# Patient Record
Sex: MALE | Race: WHITE | NOT HISPANIC OR LATINO
[De-identification: names, ages, dates, MRNs, and addresses within clinical notes are randomized per-mention and may not be internally consistent; named-entity substitution may affect disease eponyms.]

---

## 2023-05-30 ENCOUNTER — APPOINTMENT (OUTPATIENT)
Dept: ORTHOPEDIC SURGERY | Facility: CLINIC | Age: 33
End: 2023-05-30
Payer: COMMERCIAL

## 2023-05-30 VITALS — WEIGHT: 185 LBS | HEIGHT: 70 IN | BODY MASS INDEX: 26.48 KG/M2

## 2023-05-30 DIAGNOSIS — S63.8X1A SPRAIN OF OTHER PART OF RIGHT WRIST AND HAND, INITIAL ENCOUNTER: ICD-10-CM

## 2023-05-30 DIAGNOSIS — Z78.9 OTHER SPECIFIED HEALTH STATUS: ICD-10-CM

## 2023-05-30 PROCEDURE — 99203 OFFICE O/P NEW LOW 30 MIN: CPT

## 2023-05-30 PROCEDURE — 73130 X-RAY EXAM OF HAND: CPT | Mod: RT

## 2023-05-30 NOTE — PHYSICAL EXAM
[5th] : 5th [CMC Joint] : CMC joint [Right] : right hand [There are no fractures, subluxations or dislocations. No significant abnormalities are seen] : There are no fractures, subluxations or dislocations. No significant abnormalities are seen [] : no ecchymosis [de-identified] : hook of hamate

## 2023-05-30 NOTE — HISTORY OF PRESENT ILLNESS
[7] : 7 [1] : 2 [Localized] : localized [Sharp] : sharp [Full time] : Work status: full time [de-identified] : 33 year old male presenting with a RIGHT hand injury. He struck the ground while playing golf. Pain is in the 5th metacarpal and CMC. \par DOI: 4/27/23  [] : Post Surgical Visit: no [FreeTextEntry1] : R hand/wrist  [FreeTextEntry3] : 4/27/23 [FreeTextEntry5] : 32 y/o RHD M eval R hand/wrist s/p golf injury when his club jammed the bunker wall and his hand buckled on DOI above. pt states he re injured the same had on 5/11/23 with same DORETHA. No prior TX  [de-identified] : None [de-identified] :

## 2023-05-30 NOTE — DISCUSSION/SUMMARY
[de-identified] : Discussed the nature of the diagnosis and risk and benefits of different modalities of treatment.\par Xrays reviewed. \par 5th CMC sprain.\par Recommended rest.\par RTO 2 weeks. 37.3 30.4

## 2023-06-12 ENCOUNTER — APPOINTMENT (OUTPATIENT)
Dept: ORTHOPEDIC SURGERY | Facility: CLINIC | Age: 33
End: 2023-06-12
Payer: COMMERCIAL

## 2023-06-12 VITALS — BODY MASS INDEX: 26.48 KG/M2 | WEIGHT: 185 LBS | HEIGHT: 70 IN

## 2023-06-12 DIAGNOSIS — S63.8X1A SPRAIN OF OTHER PART OF RIGHT WRIST AND HAND, INITIAL ENCOUNTER: ICD-10-CM

## 2023-06-12 PROCEDURE — 99213 OFFICE O/P EST LOW 20 MIN: CPT

## 2023-06-12 NOTE — HISTORY OF PRESENT ILLNESS
[3] : 3 [0] : 0 [Rest] : rest [Full time] : Work status: full time [de-identified] : 33 year old male followed for a RT 5th CMC sprain. Has been resting the hand. He is feeling improved. \par DOI: 4/27/23  [] : no [FreeTextEntry1] : right hand/wrist  [FreeTextEntry3] : 4/27/23 [de-identified] : golfing, gripping  [de-identified] : r [de-identified] :

## 2023-06-12 NOTE — DISCUSSION/SUMMARY
[de-identified] : He is improved at this time. \par Slowly start it increase activity after 2 weeks. \par RTO 3 weeks. \par

## 2023-06-12 NOTE — PHYSICAL EXAM
[Right] : right hand [5th] : 5th [CMC Joint] : CMC joint [] : negative Cordoba's [de-identified] : trace tenderness

## 2023-06-13 ENCOUNTER — APPOINTMENT (OUTPATIENT)
Dept: ORTHOPEDIC SURGERY | Facility: CLINIC | Age: 33
End: 2023-06-13

## 2023-06-26 ENCOUNTER — APPOINTMENT (OUTPATIENT)
Dept: ORTHOPEDIC SURGERY | Facility: CLINIC | Age: 33
End: 2023-06-26

## 2024-05-17 ENCOUNTER — APPOINTMENT (OUTPATIENT)
Dept: ORTHOPEDIC SURGERY | Facility: CLINIC | Age: 34
End: 2024-05-17
Payer: COMMERCIAL

## 2024-05-17 VITALS — HEIGHT: 70 IN | WEIGHT: 185 LBS | BODY MASS INDEX: 26.48 KG/M2

## 2024-05-17 DIAGNOSIS — M65.9 SYNOVITIS AND TENOSYNOVITIS, UNSPECIFIED: ICD-10-CM

## 2024-05-17 PROCEDURE — 99213 OFFICE O/P EST LOW 20 MIN: CPT

## 2024-05-17 PROCEDURE — 73110 X-RAY EXAM OF WRIST: CPT | Mod: LT

## 2024-05-17 NOTE — PHYSICAL EXAM
[Dorsal Wrist] : dorsal wrist [Left] : left fingers [There are no fractures, subluxations or dislocations. No significant abnormalities are seen] : There are no fractures, subluxations or dislocations. No significant abnormalities are seen [] : negative Cordoba's [FreeTextEntry3] : sight dorsal wrist fullness

## 2024-05-17 NOTE — HISTORY OF PRESENT ILLNESS
[de-identified] : 34 year old male presenting with LEFT dorsal wrist pain for the past 2 months. No specific injury/trauma. Pain with forced flexion and extension of the wrist. Symptoms improved after taking NSAIDs.    [FreeTextEntry1] : LFT wrist/hand [FreeTextEntry5] : LFT wrist/hand developed a while ago. States he plays golf

## 2024-05-17 NOTE — DISCUSSION/SUMMARY
[de-identified] : Discussed the nature of the diagnosis and risk and benefits of different modalities of treatment. LT wrist x-rays reviewed and discussed.  LT wrist synotivits, consider dorsal ganglion.  Conservative management.  Avoidance of forced full flexion and extension.  PRN.